# Patient Record
Sex: FEMALE | Race: WHITE | NOT HISPANIC OR LATINO | Employment: OTHER | URBAN - METROPOLITAN AREA
[De-identification: names, ages, dates, MRNs, and addresses within clinical notes are randomized per-mention and may not be internally consistent; named-entity substitution may affect disease eponyms.]

---

## 2023-12-27 ENCOUNTER — WALK IN (OUTPATIENT)
Dept: URGENT CARE | Age: 23
End: 2023-12-27

## 2023-12-27 VITALS
DIASTOLIC BLOOD PRESSURE: 72 MMHG | TEMPERATURE: 98.3 F | OXYGEN SATURATION: 99 % | SYSTOLIC BLOOD PRESSURE: 120 MMHG | RESPIRATION RATE: 18 BRPM | HEART RATE: 99 BPM

## 2023-12-27 DIAGNOSIS — J20.9 ACUTE BRONCHITIS WITH BRONCHOSPASM: Primary | ICD-10-CM

## 2023-12-27 PROCEDURE — 99203 OFFICE O/P NEW LOW 30 MIN: CPT | Performed by: PHYSICIAN ASSISTANT

## 2023-12-27 RX ORDER — AZITHROMYCIN 250 MG/1
TABLET, FILM COATED ORAL
Qty: 6 TABLET | Refills: 0 | Status: SHIPPED | OUTPATIENT
Start: 2023-12-27 | End: 2024-01-01

## 2023-12-27 RX ORDER — ALBUTEROL SULFATE 90 UG/1
2 AEROSOL, METERED RESPIRATORY (INHALATION) EVERY 4 HOURS PRN
Qty: 8.5 G | Refills: 0 | Status: SHIPPED | OUTPATIENT
Start: 2023-12-27

## 2023-12-27 RX ORDER — PREDNISONE 20 MG/1
40 TABLET ORAL DAILY
Qty: 10 TABLET | Refills: 0 | Status: SHIPPED | OUTPATIENT
Start: 2023-12-27 | End: 2024-01-01

## 2023-12-27 ASSESSMENT — ENCOUNTER SYMPTOMS
COUGH: 1
SORE THROAT: 0
WHEEZING: 0
SHORTNESS OF BREATH: 0
DIZZINESS: 0
CHILLS: 1
VOMITING: 0
NAUSEA: 0
SINUS PRESSURE: 1
FEVER: 0
DIARRHEA: 0

## 2023-12-29 ENCOUNTER — ANCILLARY PROCEDURE (OUTPATIENT)
Dept: GENERAL RADIOLOGY | Facility: CLINIC | Age: 23
End: 2023-12-29
Attending: PHYSICIAN ASSISTANT
Payer: COMMERCIAL

## 2023-12-29 ENCOUNTER — OFFICE VISIT (OUTPATIENT)
Dept: URGENT CARE | Facility: URGENT CARE | Age: 23
End: 2023-12-29
Payer: COMMERCIAL

## 2023-12-29 VITALS
RESPIRATION RATE: 20 BRPM | DIASTOLIC BLOOD PRESSURE: 80 MMHG | HEART RATE: 78 BPM | OXYGEN SATURATION: 98 % | TEMPERATURE: 98 F | SYSTOLIC BLOOD PRESSURE: 136 MMHG

## 2023-12-29 DIAGNOSIS — R05.1 ACUTE COUGH: ICD-10-CM

## 2023-12-29 DIAGNOSIS — J40 BRONCHITIS: Primary | ICD-10-CM

## 2023-12-29 PROCEDURE — 71046 X-RAY EXAM CHEST 2 VIEWS: CPT | Mod: TC | Performed by: RADIOLOGY

## 2023-12-29 PROCEDURE — 99203 OFFICE O/P NEW LOW 30 MIN: CPT | Performed by: PHYSICIAN ASSISTANT

## 2023-12-29 RX ORDER — BUSPIRONE HYDROCHLORIDE 15 MG/1
1 TABLET ORAL 3 TIMES DAILY
COMMUNITY

## 2023-12-29 RX ORDER — PREDNISONE 20 MG/1
20 TABLET ORAL DAILY
COMMUNITY

## 2023-12-29 RX ORDER — PROCHLORPERAZINE 25 MG/1
SUPPOSITORY RECTAL
COMMUNITY
Start: 2023-10-17

## 2023-12-29 RX ORDER — LORATADINE 10 MG/1
1 TABLET ORAL 2 TIMES DAILY
COMMUNITY

## 2023-12-29 RX ORDER — PEN NEEDLE, DIABETIC 32GX 5/32"
NEEDLE, DISPOSABLE MISCELLANEOUS
COMMUNITY
Start: 2023-04-18

## 2023-12-29 RX ORDER — OXCARBAZEPINE 300 MG/1
TABLET, FILM COATED ORAL
COMMUNITY

## 2023-12-29 RX ORDER — INSULIN GLARGINE 100 [IU]/ML
INJECTION, SOLUTION SUBCUTANEOUS
COMMUNITY

## 2023-12-29 RX ORDER — DULOXETIN HYDROCHLORIDE 30 MG/1
30 CAPSULE, DELAYED RELEASE ORAL DAILY
COMMUNITY

## 2023-12-29 RX ORDER — INSULIN LISPRO 100 [IU]/ML
INJECTION, SOLUTION INTRAVENOUS; SUBCUTANEOUS
COMMUNITY
Start: 2022-03-10

## 2023-12-29 RX ORDER — INSULIN ASPART 100 [IU]/ML
INJECTION, SOLUTION INTRAVENOUS; SUBCUTANEOUS
COMMUNITY

## 2023-12-29 RX ORDER — ONDANSETRON 4 MG/1
TABLET, ORALLY DISINTEGRATING ORAL
COMMUNITY

## 2023-12-29 RX ORDER — AZITHROMYCIN 250 MG/1
TABLET, FILM COATED ORAL DAILY
COMMUNITY

## 2023-12-29 NOTE — PATIENT INSTRUCTIONS
Your chest XR looks OK per my read, radiologist report with a different report, I will contact you.  Continue with antibiotics, prednisone and albuterol.  Take Mucinex for cough.  Humidified air at night, fluids and rest.  Honey for your cough.  Take a home COVID test if you have not done so already.  The prednisone is making her blood glucose elevated, try to have tight control of sugars.

## 2023-12-29 NOTE — PROGRESS NOTES
Assessment & Plan     1. Bronchitis  Patient presents to clinic for evaluation of cough and shortness of breath.  Started about 3 weeks ago.  She was diagnosed 4 days ago and treated with prednisone, albuterol and azithromycin.  She did not have a chest x-ray done, continues to feel short of breath.  On examination, she appears well.  Vital signs are stable.  Lungs are clear to auscultation bilaterally.  Chest x-ray is negative for any acute abnormality per my read.  I suspect this is a bronchitis, and I do not have anything to add to her treatment besides perhaps Flonase for nasal congestion.  She is PERC negative, low suspicion for PE.  Discussed that prednisone that she is on is increasing her blood glucose, so tight control of sugars.  If she were to have signs of DKA, which she has had in the past, and are not present today, then she should follow-up in the emergency department.  Supportive cares were encouraged with fluids, rest, Mucinex humidified air at night etc.  Discussed indications for follow-up in the emergency department.  - XR Chest 2 Views; Future        Return in about 1 week (around 1/5/2024), or if symptoms worsen or fail to improve.    Diagnosis and treatment plan was reviewed with patient and/or family.   We went over any labs or imaging. Discussed worsening symptoms or little to no relief despite treatment plan to follow-up with PCP or return to clinic.  Patient verbalizes understanding. All questions were addressed and answered.     BIENVENIDO [facility]    CHIEF COMPLAINT:   Chief Complaint   Patient presents with    Cough     Cough/sob  X3 wks     José Hill is a 23 year old female who presents to clinic today for evaluation of cough and shortness of breath. Symptoms started about 3 weeks ago. Worsening earlier this week and was treated for bronchitis with azithromycin, prednisone and albuterol. Wheezing has improved, but still feeling  constricted in her breathing.   Cough is productive of mucus.  Initially with the illness, she had a low grade fever, but that has since resolved.    Blood glucose running high.   She denies having any leg pain or swelling.       No past medical history on file.  No past surgical history on file.  Social History     Tobacco Use    Smoking status: Not on file    Smokeless tobacco: Not on file   Substance Use Topics    Alcohol use: Not on file     Current Outpatient Medications   Medication    azithromycin (ZITHROMAX) 250 MG tablet    Continuous Blood Gluc Sensor (DEXCOM G6 SENSOR) MISC    Continuous Blood Gluc Transmit (DEXCOM G6 TRANSMITTER) MISC    insulin aspart (NOVOLOG FLEXPEN) 100 UNIT/ML pen    insulin glargine (LANTUS SOLOSTAR) 100 UNIT/ML pen    insulin lispro (HUMALOG KWIKPEN) 100 UNIT/ML (1 unit dial) KWIKPEN    loratadine (CLARITIN) 10 MG tablet    ondansetron (ZOFRAN ODT) 4 MG ODT tab    predniSONE (DELTASONE) 20 MG tablet    ULTICARE MICRO 32G X 4 MM insulin pen needle    busPIRone (BUSPAR) 15 MG tablet    DULoxetine (CYMBALTA) 30 MG capsule    OXcarbazepine (TRILEPTAL) 300 MG tablet     No current facility-administered medications for this visit.     Allergies   Allergen Reactions    Cetirizine Other (See Comments)     Hallucinations per mom    Acetaminophen     American Cockroach     Arnel Grass Pollen Allergen        10 point ROS of systems were all negative except for pertinent positives noted in my HPI.      Exam:   /80   Pulse 78   Temp 98  F (36.7  C)   Resp 20   SpO2 98%   Constitutional: healthy, alert and no distress  Head: Normocephalic, atraumatic.  Eyes: conjunctiva clear, no drainage  ENT: TMs clear and shiny reynold, nasal mucosa pink and moist, throat without tonsillar hypertrophy or erythema  Neck: neck is supple, no cervical lymphadenopathy or nuchal rigidity  Cardiovascular: RRR  Respiratory: CTA bilaterally, no rhonchi or rales  Skin: no rashes  Neurologic: Speech clear, gait  normal. Moves all extremities.    CXR -- No acute abnormality per my read.